# Patient Record
(demographics unavailable — no encounter records)

---

## 2025-04-21 NOTE — ASSESSMENT
[FreeTextEntry1] : MAVERICK is a 10 year old here with father with concerns for ADHD. Currently in a general education classroom with an IEP and receives OT and resource room. Non focal neuro exam. Denies staring, twitching, seizure or seizure-like activity. Will proceed with ADHD work up using [place] forms.

## 2025-04-21 NOTE — PHYSICAL EXAM
[Well-appearing] : well-appearing [Normocephalic] : normocephalic [No dysmorphic facial features] : no dysmorphic facial features [No ocular abnormalities] : no ocular abnormalities [Straight] : straight [No deformities] : no deformities [Alert] : alert [Well related, good eye contact] : well related, good eye contact [Conversant] : conversant [Normal speech and language] : normal speech and language [Follows instructions well] : follows instructions well [No facial asymmetry or weakness] : no facial asymmetry or weakness [Gross hearing intact] : gross hearing intact [Gets up on table without difficulty] : gets up on table without difficulty [No abnormal involuntary movements] : no abnormal involuntary movements [Walks and runs well] : walks and runs well [Good walking balance] : good walking balance [Normal gait] : normal gait

## 2025-04-21 NOTE — CONSULT LETTER
[Dear  ___] : Dear  [unfilled], [Consult Letter:] : I had the pleasure of evaluating your patient, [unfilled]. [Please see my note below.] : Please see my note below. [Consult Closing:] : Thank you very much for allowing me to participate in the care of this patient.  If you have any questions, please do not hesitate to contact me. [Sincerely,] : Sincerely, [FreeTextEntry3] : Arleen Veliz, LISA-BC Board Certified Family Nurse Practitioner Pediatric Neurology [facility] [address] [place] [address] Tel: [phone] Fax: [phone]

## 2025-04-21 NOTE — PLAN
[FreeTextEntry1] :   - [place] questionnaires given for parent and teacher- to be returned - Discussed use of medications as well as possible side effects  - Follow up 1 month to review [place] questionnaires

## 2025-04-21 NOTE — REASON FOR VISIT
[Initial Consultation] : an initial consultation for [ADHD] : ADHD [Patient] : patient [Father] : father [Mother] : mother

## 2025-04-21 NOTE — HISTORY OF PRESENT ILLNESS
[FreeTextEntry1] : MAVERICK KAUFMAN is a 10 year old male here for an initial evaluation for ADHD.   Educational assessment: Current Grade: 5th Current District: [employer]  Maverick is currently in general education with an IEP and receives resource room and OT. Teachers have continued concerns with inattention. He takes a very long time to complete his school works and requires a lot of one on one support. He needs a lot of redirection and oftentimes assignments are not completed on time. Academically he continues to do well. When he is able to focus he does well, but getting him to focus is challenging.  At home parents report the same concerns. It takes 2-3 hours to complete homework and he needs constant redirection. Even when working with the  it is hard for him to stay focused. He requires a lot of motivational support to build his confidence. Maverick is able to sit for a meal or a movie. Sometimes he can follow multistep commands, but oftentimes he needs repetition of directions. He plays lacrosse, martial arts, and baseball. Martial arts has been difficult with following directions and he has one on one for part of the class. He has a hard time expressing his emotions, making it difficult to regulate himself.   Socially Maverick behaves a little immature compared to others his age. He lacks confidence in his interactions with other kids.   No concern for anxiety, depression, OCD.  Denies any issues with sleep initiation or maintaining sleep throughout the night. Denies any parasomnias or restlessness while asleep.   Denies staring, twitching, seizure or seizure-like activity. No serious head injury, meningoencephalitis.

## 2025-05-19 NOTE — PLAN
[FreeTextEntry1] : - Concerta 18 mg - Letter given to parent to provide school with diagnosis and recommending accommodations/services  - Discussed use of medications as well as possible side effects  - Follow up 1 month

## 2025-05-19 NOTE — ASSESSMENT
[FreeTextEntry1] : MAVERICK is a 10 year old here with mother for a follow up for ADHD. Currently in a general education classroom with an IEP and receives OT and resource room. 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 Letter given for school. Will start stimulant medication.

## 2025-05-19 NOTE — CONSULT LETTER
[Dear  ___] : Dear  [unfilled], [Consult Letter:] : I had the pleasure of evaluating your patient, [unfilled]. [Please see my note below.] : Please see my note below. [Consult Closing:] : Thank you very much for allowing me to participate in the care of this patient.  If you have any questions, please do not hesitate to contact me. [Sincerely,] : Sincerely, [FreeTextEntry3] : Arleen Veliz, LISA-BC Board Certified Family Nurse Practitioner Pediatric Neurology [facility] [address] [place] [address] Tel: [phone] Fax: [phone]

## 2025-05-19 NOTE — HISTORY OF PRESENT ILLNESS
[FreeTextEntry1] : MAVERICK KAUFMAN is a 10 year old male here for a follow up for ADHD.  [place] questionnaires were completed by parents and teacher.    Parents responses:  Inattention 9/9   Hyperactivity 1/9  ODD: 0/8  Conduct disorder: 1/14  Anxiety/ Depression: 1/7   Teachers responses:  Inattention 9/9  Hyperactivity 4/9  ODD/ Conduct: 0/10  Anxiety/ Depression: 0/7   + ADHD inattentive type Performance questions: Parents: Areas of concern include overall school performance, and writing. Teachers: Areas of concern include reading, writing, math, following directions, disrupting class, assignment completion, and organizational skills.  Initial Evaluation:   Educational assessment: Current Grade: 5th Current District: [employer] [facility]  Maverick is currently in general education with an IEP and receives resource room and OT. Teachers have continued concerns with inattention. He takes a very long time to complete his school works and requires a lot of one on one support. He needs a lot of redirection and oftentimes assignments are not completed on time. Academically he continues to do well. When he is able to focus he does well, but getting him to focus is challenging.  At home parents report the same concerns. It takes 2-3 hours to complete homework and he needs constant redirection. Even when working with the  it is hard for him to stay focused. He requires a lot of motivational support to build his confidence. Maverick is able to sit for a meal or a movie. Sometimes he can follow multistep commands, but oftentimes he needs repetition of directions. He plays lacrosse, martial arts, and baseball. Martial arts has been difficult with following directions and he has one on one for part of the class. He has a hard time expressing his emotions, making it difficult to regulate himself.   Socially Maverick behaves a little immature compared to others his age. He lacks confidence in his interactions with other kids.   No concern for anxiety, depression, OCD.  Denies any issues with sleep initiation or maintaining sleep throughout the night. Denies any parasomnias or restlessness while asleep.   Denies staring, twitching, seizure or seizure-like activity. No serious head injury, meningoencephalitis.

## 2025-05-19 NOTE — REASON FOR VISIT
[Home] : at home, [unfilled] , at the time of the visit. [Medical Office: ([facility])___] : at the medical office located in  [Telehealth (audio & video)] : This visit was provided via telehealth using real-time 2-way audio visual technology. [Technical] : patient unable to effectively utilize tele-video due to technical issues. [Follow-Up Evaluation] : a follow-up evaluation for [ADHD] : ADHD [Patient] : patient [Mother] : mother [Medical Records] : medical records [FreeTextEntry3] : mother

## 2025-05-19 NOTE — DATA REVIEWED
[FreeTextEntry1] : [place] questionnaires were completed by parents and teacher.    Parents responses:  Inattention 9/9   Hyperactivity 1/9  ODD: 0/8  Conduct disorder: 1/14  Anxiety/ Depression: 1/7   Teachers responses:  Inattention 9/9  Hyperactivity 4/9  ODD/ Conduct: 0/10  Anxiety/ Depression: 0/7   + ADHD inattentive type Performance questions: Parents: Areas of concern include overall school performance, and writing. Teachers: Areas of concern include reading, writing, math, following directions, disrupting class, assignment completion, and organizational skills.

## 2025-06-23 NOTE — CONSULT LETTER
[Dear  ___] : Dear  [unfilled], [Consult Letter:] : I had the pleasure of evaluating your patient, [unfilled]. [Please see my note below.] : Please see my note below. [Consult Closing:] : Thank you very much for allowing me to participate in the care of this patient.  If you have any questions, please do not hesitate to contact me. [Sincerely,] : Sincerely, [FreeTextEntry3] : Arleen Veliz, LISA-BC Board Certified Family Nurse Practitioner Pediatric Neurology [facility] [address] [place] [address] Tel: [phone] Fax: [phone]

## 2025-06-23 NOTE — CONSULT LETTER
[Dear  ___] : Dear  [unfilled], [Consult Letter:] : I had the pleasure of evaluating your patient, [unfilled]. [Please see my note below.] : Please see my note below. [Consult Closing:] : Thank you very much for allowing me to participate in the care of this patient.  If you have any questions, please do not hesitate to contact me. [Sincerely,] : Sincerely, [FreeTextEntry3] : Arleen Veliz, LISA-BC Board Certified Family Nurse Practitioner Pediatric Neurology [facility] [address] [place] [address] Tel: [phone] Fax: [phone]

## 2025-06-23 NOTE — HISTORY OF PRESENT ILLNESS
[FreeTextEntry1] : MAVERICK KAUFMAN is a 10 year old male with ADHD on Concerta 18 mg here for a follow up.  Interval Hx: Maverick has been doing well with current medication. There is improvement with focus. Medication typically wears off by 1pm. Denies any negative side effects.  Initial Evaluation:   Educational assessment: Current Grade: 5th Current District: [employer] [facility]  Maverick is currently in general education with an IEP and receives resource room and OT. Teachers have continued concerns with inattention. He takes a very long time to complete his school works and requires a lot of one on one support. He needs a lot of redirection and oftentimes assignments are not completed on time. Academically he continues to do well. When he is able to focus he does well, but getting him to focus is challenging.  At home parents report the same concerns. It takes 2-3 hours to complete homework and he needs constant redirection. Even when working with the  it is hard for him to stay focused. He requires a lot of motivational support to build his confidence. Maverick is able to sit for a meal or a movie. Sometimes he can follow multistep commands, but oftentimes he needs repetition of directions. He plays lacrosse, martial arts, and baseball. Martial arts has been difficult with following directions and he has one on one for part of the class. He has a hard time expressing his emotions, making it difficult to regulate himself.   Socially Maverick behaves a little immature compared to others his age. He lacks confidence in his interactions with other kids.   No concern for anxiety, depression, OCD.  Denies any issues with sleep initiation or maintaining sleep throughout the night. Denies any parasomnias or restlessness while asleep.   Denies staring, twitching, seizure or seizure-like activity. No serious head injury, meningoencephalitis.

## 2025-06-23 NOTE — REASON FOR VISIT
[Follow-Up Evaluation] : a follow-up evaluation for [ADHD] : ADHD [Patient] : patient [Medical Records] : medical records [Mother] : mother [Home] : at home, [unfilled] , at the time of the visit. [Medical Office: ([facility])___] : at the medical office located in  [Telehealth (audio & video)] : This visit was provided via telehealth using real-time 2-way audio visual technology. [Technical] : patient unable to effectively utilize tele-video due to technical issues. [FreeTextEntry3] : mother

## 2025-06-23 NOTE — DATA REVIEWED
[FreeTextEntry1] : [place] questionnaires were completed by parents and teacher.    Parents responses:  Inattention 9/9   Hyperactivity 1/9  ODD: 0/8  Conduct disorder: 1/14  Anxiety/ Depression: 1/7   Teachers responses:  Inattention 9/9  Hyperactivity 4/9  ODD/ Conduct: 0/10  Anxiety/ Depression: 0/7   + ADHD inattentive type Performance questions: Parents: Areas of concern include overall school performance, and writing. Teachers: Areas of concern include reading, writing, math, following directions, disrupting class, assignment completion, and organizational skills.

## 2025-06-23 NOTE — PLAN
[FreeTextEntry1] : - Concerta 27 mg - Discussed use of medications as well as possible side effects  - Follow up 3-4 months

## 2025-06-23 NOTE — REASON FOR VISIT
[Follow-Up Evaluation] : a follow-up evaluation for [ADHD] : ADHD [Patient] : patient [Medical Records] : medical records [Mother] : mother [Home] : at home, [unfilled] , at the time of the visit. [Medical Office: ([facility])___] : at the medical office located in  [Telehealth (audio & video)] : This visit was provided via telehealth using real-time 2-way audio visual technology. [Technical] : patient unable to effectively utilize tele-video due to technical issues. [FreeTextEntry3] : mother

## 2025-06-23 NOTE — DATA REVIEWED
[FreeTextEntry1] : [place] questionnaires were completed by parents and teacher.    Parents responses:  Inattention 9/9   Hyperactivity 1/9  ODD: 0/8  Conduct disorder: 1/14  Anxiety/ Depression: 1/7   Teachers responses:  Inattention 9/9  Hyperactivity 4/9  ODD/ Conduct: 0/10  Anxiety/ Depression: 0/7   + ADHD inattentive type Performance questions: Parents: Areas of concern include overall school performance, and writing. Teachers: Areas of concern include reading, writing, math, following directions, disrupting class, assignment completion, and organizational skills.

## 2025-06-23 NOTE — ASSESSMENT
[FreeTextEntry1] : MAVERICK is a 10 year old here with mother for a follow up for ADHD. Currently in a general education classroom with an IEP and receives OT and resource room.  Reportedly doing well on current medication regimen. Denies any negative side effects. Will increase current dose of stimulant medication for ADHD.

## 2025-06-23 NOTE — HISTORY OF PRESENT ILLNESS
[FreeTextEntry1] : MAVERICK KAUFMAN is a 10 year old male with ADHD on Concerta 18 mg here for a follow up.  Interval Hx: Maverick has been doing well with current medication. There is improvement with focus. Medication typically wears off by 1pm. Denies any negative side effects.  Initial Evaluation:   Educational assessment: Current Grade: 5th Current District: [employer] [employer] [facility]  Maverick is currently in general education with an IEP and receives resource room and OT. Teachers have continued concerns with inattention. He takes a very long time to complete his school works and requires a lot of one on one support. He needs a lot of redirection and oftentimes assignments are not completed on time. Academically he continues to do well. When he is able to focus he does well, but getting him to focus is challenging.  At home parents report the same concerns. It takes 2-3 hours to complete homework and he needs constant redirection. Even when working with the  it is hard for him to stay focused. He requires a lot of motivational support to build his confidence. Maverick is able to sit for a meal or a movie. Sometimes he can follow multistep commands, but oftentimes he needs repetition of directions. He plays lacrosse, martial arts, and baseball. Martial arts has been difficult with following directions and he has one on one for part of the class. He has a hard time expressing his emotions, making it difficult to regulate himself.   Socially Maverick behaves a little immature compared to others his age. He lacks confidence in his interactions with other kids.   No concern for anxiety, depression, OCD.  Denies any issues with sleep initiation or maintaining sleep throughout the night. Denies any parasomnias or restlessness while asleep.   Denies staring, twitching, seizure or seizure-like activity. No serious head injury, meningoencephalitis.